# Patient Record
(demographics unavailable — no encounter records)

---

## 2025-02-18 NOTE — HISTORY OF PRESENT ILLNESS
[FreeTextEntry1] : establish care weight management [de-identified] : PMH: anxiety/PTSD, ?white coat hypertension  Had a bad injury while playing high school football that required hospital admission and surgeries at [facility] Since then has had some anxiety with visiting doctors. Wife is an ER physician, checks BP's at home, generally systolics are between 130 and 140.  Has been working diligently on diet/exercise for the past several months, but has been unable to see any results in terms of weight loss - intermittent fasting, eating a good amount of fruits/vegetables. Mental health is stable with fluoxetine. No sexual health issues. No changes to bowel movements. 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   Family history: Mom - MS Father - prostate cancer; 2013 - 61yo

## 2025-02-18 NOTE — PHYSICAL EXAM
[No Acute Distress] : no acute distress [Well Nourished] : well nourished [Well-Appearing] : well-appearing [Thyroid Normal, No Nodules] : the thyroid was normal and there were no nodules present [No Respiratory Distress] : no respiratory distress  [No Accessory Muscle Use] : no accessory muscle use [Clear to Auscultation] : lungs were clear to auscultation bilaterally [Normal Rate] : normal rate  [Regular Rhythm] : with a regular rhythm [Normal S1, S2] : normal S1 and S2 [No Murmur] : no murmur heard

## 2025-03-21 NOTE — HISTORY OF PRESENT ILLNESS
[FreeTextEntry1] : weight management [de-identified] : PMH: anxiety/PTSD, ?white coat hypertension  Started using compounded semaglutide from [facility] 0.3mg weekly, then uptitrated quickly to 0.6mg because didn't notice much difference. Is feeling a bit more nausea with this dose. Has zofran prescribed as well which he has taken a couple of times No other side effects. Reports a 7 pound weight loss as per his home scale.  He is in regular contact with team at [facility] as well.  Continues to exercise and is careful with his diet.

## 2025-03-21 NOTE — HISTORY OF PRESENT ILLNESS
[FreeTextEntry1] : weight management [de-identified] : PMH: anxiety/PTSD, ?white coat hypertension  Started using compounded semaglutide from [place] 0.3mg weekly, then uptitrated quickly to 0.6mg because didn't notice much difference. Is feeling a bit more nausea with this dose. Has zofran prescribed as well which he has taken a couple of times No other side effects. Reports a 7 pound weight loss as per his home scale.  He is in regular contact with team at [facility] as well.  Continues to exercise and is careful with his diet.